# Patient Record
Sex: MALE | Race: WHITE | NOT HISPANIC OR LATINO | Employment: FULL TIME | URBAN - METROPOLITAN AREA
[De-identification: names, ages, dates, MRNs, and addresses within clinical notes are randomized per-mention and may not be internally consistent; named-entity substitution may affect disease eponyms.]

---

## 2017-02-16 ENCOUNTER — LAB REQUISITION (OUTPATIENT)
Dept: LAB | Facility: HOSPITAL | Age: 22
End: 2017-02-16
Payer: COMMERCIAL

## 2017-02-16 DIAGNOSIS — J11.1 INFLUENZA DUE TO UNIDENTIFIED INFLUENZA VIRUS WITH OTHER RESPIRATORY MANIFESTATIONS: ICD-10-CM

## 2017-02-16 PROCEDURE — 87798 DETECT AGENT NOS DNA AMP: CPT | Performed by: NURSE PRACTITIONER

## 2017-02-17 LAB
FLUAV AG SPEC QL: DETECTED
FLUBV AG SPEC QL: ABNORMAL
RSV B RNA SPEC QL NAA+PROBE: ABNORMAL

## 2017-10-09 ENCOUNTER — ALLSCRIPTS OFFICE VISIT (OUTPATIENT)
Dept: OTHER | Facility: OTHER | Age: 22
End: 2017-10-09

## 2017-10-10 NOTE — PROGRESS NOTES
Assessment  1  Concussion (850 9) ([id number])    Discussion/Summary  Discussion Summary:   I explained my current clinical findings to [facility] today  His currently asymptomatic in his clinical exam does not reveal any focal neurological, vestibular or oculomotor deficits  Hence, he may progress to full academic activities including testing as tolerated  He may also progress his physical activities as tolerated  I reviewed concussion associated symptoms with him  I will see him back in the office on an as-needed basis  Chief Complaint  1  Headache  Follow-up concussion      History of Present Illness  HPI: [facility] is a 29-year-old [employer] who is here today for evaluation of a suspected concussion injury  He reports that about 9 days ago on 9/30/17, he fell down under the influence of alcohol and hit his head  Denies any loss of consciousness and does remember the event  Hit his head on the left temporal side  No history of immediate seizures, vomiting or limb weakness  Was later evaluated the [address] [place] [address] and was diagnosed to have a concussion injury  Has not done any academic testing last week and now reports to be fully asymptomatic for about 3-4 days  Background history significant for 1 previous concussion which took about 3-4 weeks for recovery  He is not an athlete  Concussion, Acute [facility]: The patient is being seen for an initial evaluation of a concussion     Symptoms   Total Physical Score is 0   Total Cognitive Score is 0   Total Emotional Score is 0   Total Sleep Score is 0   Total Symptom Score is 0   Pertinent History   His pertinent medical history includes no previous head injury,-no past coagulopathy,-no previous history of headaches,-no current anticoagulation therapy,-no previous history of migraine headaches,-no history of learning disability,-no history of ADD/ADHD,-no history of anxiety,-no depression,-no history of sleep disorder,-no family history of headaches-and-no history of other psychiatric disorder  The patient is currently unable to participate in sports and able to attend school full time  Review of Systems    Constitutional: no fever or chills, feels well, no tiredness, no recent weight loss or weight gain  ENT: no complaints of earache, no loss of hearing, no nosebleeds or nasal discharge, no sore throat or hoarseness  Cardiovascular: no complaints of slow or fast heart rate, no chest pain, no palpitations, no leg claudication or lower extremity edema  Respiratory: no complaints of shortness of breath, no wheezing or cough, no dyspnea on exertion, no orthopnea or PND  Gastrointestinal: no complaints of abdominal pain, no constipation, no nausea or vomiting, no diarrhea or bloody stools  Genitourinary: no complaints of dysuria or incontinence, no hesitancy, no nocturia, no genital lesion, no inadequacy of penile erection  Musculoskeletal: no complaints of arthralgia, no myalgia, no joint swelling or stiffness, no limb pain or swelling  Integumentary: no complaints of skin rash or lesion, no itching or dry skin, no skin wounds  Neurological: as noted in HPI  Active Problems  1  Acne (706 1) (L70 9)    Past Medical History  1  History of Allergic contact dermatitis due to plants, except food (692 6) (L23 7)    Surgical History  No significant surgical history     Family History  Mother    1  Family history of Allergy to penicillin  Family History Reviewed: The family history was reviewed and updated today  Social History   · Activities: Basketball   · Activities: Volleyball   · Brushes teeth daily   · Dental care, regularly   · Lives with mother (single parent)   · Never a smoker   · No tobacco/smoke exposure   · Sleeps 8 - 10 hours a day  Social History Reviewed: The social history was reviewed and updated today  The social history was reviewed and is unchanged  Current Meds   1   No Reported Medications Recorded    Allergies  1  No Known Drug Allergies  2  No Known Environmental Allergies   3  No Known Food Allergies    Vitals  Vital Signs    Recorded: [id number] 11:10AM   Heart Rate 64   Systolic 835   Diastolic 84   Height 6 ft    Weight 199 lb    BMI Calculated 26 99   BSA Calculated 2 13     Results/Data  No recent results     Physical Exam  General Multi-System Exam (Brief) Male Concussion:   Constitutional   General appearance: Normal     Eyes   Conjunctiva and lids: Normal     Pupils and irises: Normal     Ears, Nose, Mouth, and Throat   External inspection of ears and nose: Normal     Musculoskeletal   Gait and station: Normal     Inspection/palpation of joints, bones, and muscles: Normal     Skin   Skin and subcutaneous tissue: Normal     Neurologic   Cranial nerves: Normal     Sensation: Normal     Coordination: Normal     Gaze stability was normal Accommodation is 6 cm  Convergence is 4 cm  Psychiatric   Orientation to person, place, and time: Normal     Mood and affect: Normal        End of Encounter Meds  1  No Reported Medications Recorded    Signatures   Electronically signed by :  MARYANN Sheehan ; Oct  9 2017  1:12PM EST                       (Author)

## 2018-01-13 VITALS
SYSTOLIC BLOOD PRESSURE: 127 MMHG | HEIGHT: 72 IN | HEART RATE: 64 BPM | BODY MASS INDEX: 26.95 KG/M2 | WEIGHT: 199 LBS | DIASTOLIC BLOOD PRESSURE: 84 MMHG

## 2018-01-13 NOTE — MISCELLANEOUS
Message  Return to work or school:   Esther Nowak is under my professional care  He was seen in my office on 10/9/17       May return back to all academic activities including testing as tolerated  May return back to physical activities as tolerated  Emmy Dave       Signatures   Electronically signed by :  MARYANN Mukherjee ; Oct  9 2017 11:32AM EST                       (Author)

## 2019-03-06 ENCOUNTER — TRANSCRIBE ORDERS (OUTPATIENT)
Dept: URGENT CARE | Facility: MEDICAL CENTER | Age: 24
End: 2019-03-06

## 2019-03-06 ENCOUNTER — APPOINTMENT (OUTPATIENT)
Dept: RADIOLOGY | Facility: MEDICAL CENTER | Age: 24
End: 2019-03-06
Payer: OTHER MISCELLANEOUS

## 2019-03-06 ENCOUNTER — APPOINTMENT (OUTPATIENT)
Dept: URGENT CARE | Facility: MEDICAL CENTER | Age: 24
End: 2019-03-06
Payer: OTHER MISCELLANEOUS

## 2019-03-06 DIAGNOSIS — M89.8X1 PAIN OF RIGHT SCAPULA: Primary | ICD-10-CM

## 2019-03-06 DIAGNOSIS — M89.8X1 PAIN OF RIGHT SCAPULA: ICD-10-CM

## 2019-03-06 PROCEDURE — G0382 LEV 3 HOSP TYPE B ED VISIT: HCPCS | Performed by: PHYSICIAN ASSISTANT

## 2019-03-06 PROCEDURE — 73010 X-RAY EXAM OF SHOULDER BLADE: CPT

## 2019-03-06 PROCEDURE — 99283 EMERGENCY DEPT VISIT LOW MDM: CPT | Performed by: PHYSICIAN ASSISTANT

## 2019-03-11 ENCOUNTER — APPOINTMENT (OUTPATIENT)
Dept: URGENT CARE | Facility: MEDICAL CENTER | Age: 24
End: 2019-03-11
Payer: OTHER MISCELLANEOUS

## 2019-03-11 PROCEDURE — 99213 OFFICE O/P EST LOW 20 MIN: CPT | Performed by: FAMILY MEDICINE

## 2019-03-21 ENCOUNTER — APPOINTMENT (OUTPATIENT)
Dept: URGENT CARE | Facility: MEDICAL CENTER | Age: 24
End: 2019-03-21
Payer: OTHER MISCELLANEOUS

## 2019-03-21 PROCEDURE — 99213 OFFICE O/P EST LOW 20 MIN: CPT | Performed by: NURSE PRACTITIONER

## 2019-03-27 ENCOUNTER — APPOINTMENT (OUTPATIENT)
Dept: URGENT CARE | Facility: MEDICAL CENTER | Age: 24
End: 2019-03-27
Payer: OTHER MISCELLANEOUS

## 2019-03-27 PROCEDURE — 99213 OFFICE O/P EST LOW 20 MIN: CPT | Performed by: PHYSICIAN ASSISTANT

## 2019-04-03 ENCOUNTER — APPOINTMENT (OUTPATIENT)
Dept: URGENT CARE | Facility: MEDICAL CENTER | Age: 24
End: 2019-04-03
Payer: OTHER MISCELLANEOUS

## 2019-04-03 PROCEDURE — 99213 OFFICE O/P EST LOW 20 MIN: CPT | Performed by: PHYSICIAN ASSISTANT
